# Patient Record
Sex: MALE | Race: WHITE
[De-identification: names, ages, dates, MRNs, and addresses within clinical notes are randomized per-mention and may not be internally consistent; named-entity substitution may affect disease eponyms.]

---

## 2020-02-05 NOTE — CONSULTATION NOTE
Palliative Care Consultation





- Referral


Referring Provider: Dr. Ignacio Garcia


Time of Visit: 1444-9149


Referral setting: Assisted living


Referral Reason: Parkinson's Disease Dementia





- Information Sources


Records reviewed: Previous records reviewed


History/Review of Systems obtained from: Patient, Family (brother/guardian 

Miguel), Caregiver


Exam limitations: Clinical condition (short term and long term memory deficits.)





- History of Present Illness


Brief History of Present Illness: 





This is a 79-year-old man who presents today for initial palliative care 

consultation for Parkinson's disease dementia.  [facility] with a focus on memory care.  His younger brother, Miguel,

who is the patient's legal guardian is able to provide additional background.  

The patient was diagnosed with Parkinson's disease approximately 5 to 6 years 

ago and he presented with a shuffling gait.  His brother reports that the 

patient and his wife lived isolated life spending most of the time at home.  A 

crisis developed when the patient's wife became ill and was hospitalized and 

transitioned to [facility] for rehabilitation and care.Without his wife he was 

unable to manage taking care of himself such as not bathing nor taking his 

medications which ultimately resulted in a hospital admission in 2016.





The patient's brother reports that he has progressively declined and in the last

year or so has been nonambulatory and is wheelchair-bound.  The brother also 

notes worsening cognition more specifically in relation to short-term memory 

deficits.  When the brother visits there are times the patient will recall 

previous lifetime events but during the course of a conversation you will often 

have to repeat yourself enormously as he does not recall what has been said.  

His wife has up to currently passed.  The patient currently resides in home 

place.





The patient himself is wary of introduction of new individuals into his care 

[place].  When this [facility] initially attempted to see the patient on February 3 he 

declined but allowed for a repeat visit with 1 of his caregivers present.  He of

fered no complaints during evaluation.  Caregivers reports that he often times 

will refuse initially medications or care but if he is reapproached he is 

excepting of interventions.





He has a longstanding history of diabetes mellitus.  He is on insulin therapy 

for control.  His last A1c obtained in 2019 was 6.8%.  His blood 

glucose has ranged at 7 AM from 119 2-87, at 11 AM from 210 /3/, at 4 PM 

from 143 2/3/2017, and at 9  2/3/1973.  There have been no episodes of 

hypoglycemia.





Patient has a past medical history that includes Parkinson's disease dementia, 

hypertension, hyperlipidemia, diabetes mellitus type 2, and longstanding skin 

condition that results in him picking his skin that is controlled with topical 

corticosteroid use.





Medical/Surgical History





- Past Medical History


Cardiovascular: reports: Hypertension, High cholesterol, Coronary artery disease


Respiratory: reports: None


Neuro: Dementia, Parkinson's


Neuro: reports: Dementia, Parkinson's


Endocrine/Autoimmune: reports: Type 2 diabetes


GI: reports: None


: reports: None


HEENT: reports: None


Psych: reports: None


Musculoskeletal: reports: None


Derm: reports: Other (Chronic UE pruritus)


MRSA Hx?: No





- Past Surgical History


Cardiovascular: reports: Coronary stent (15-20 years ago to [facility] and [facility])





- Substance History


Use: Uses substance without health or social issues: Tobacco (Former smoker appx

1ppd for 20 years.)





Social History





- Living Situation


Living arrangement: Assisted living


Support System: 





The patient is .  He does not have any children.  His brother, Miguel, 

has assumed guardianship. His brother will make visits to [facility]. 





The patient received a bachelor's of arts in English and advertising.  [place]  He also was active in a band and is 

proficient playing the organ, electric piano, and trombone. He also was an [employer]

 growing up. 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 

some touring.  He was a voracious reader and was always quite content to remain 

at home with his wife reading.  When his brother had to sell off his assets and 

his house after he relocated to [facility], he had a library that was over 2-

3000 books that was donated to the local library.  He no longer reads as he is 

unable to obtain new information while reading.  He continues to enjoy reading 

the newspaper.





Family History





- Family History


Family History: Mother:  (Both  in their 80s), Father: , 

Brother: Alive and Well





Medications/Allergies





- Medications


Home Medications: 


                                Ambulatory Orders











 Medication  Instructions  Recorded  Confirmed


 


Aspirin 81 mg 16


 


Acetaminophen 1,000 mg PO BID 20


 


Amlodipine Besylate 5 mg PO DAILY 20


 


Betamethasone Valerate 60 ml TP BID 20


 


Bisacodyl Supp [Dulcolax Supp] 10 mg [place] DAILY PRN MDD if no BM in 20





 12 hours  


 


Chlorthalidone 25 mg PO DAILY 20


 


Dextrose [Glucose Gel] 1 tube PO Q1HR PRN MDD for 20





 hypoglycemia  


 


Docusate Sodium 250Mg Capsule 250 mg PO DAILY PRN 20





[Colace 250Mg Capsule]   


 


Insulin Aspart [NovoLOG] 30 unit SQ TID MDD before meals 20


 


Insulin Detemir [Levemir Flextouch] 95 unit SQ BID 20


 


LORazepam [Ativan] 1 mg PO Q6H PRN 20


 


Loperamide HCl [Imodium A-D] 2 mg PO DAILY PRN 20


 


Mag Hydrox/Aluminum Hyd/Simeth 30 ml PO Q4H PRN 20





[Mylanta Maximum Strength Liq]   


 


Magnesium Hydroxide [Milk of 30 ml PO DAILY PRN MDD No BM in 3 20





Magnesia] days  


 


Menthol/Zinc Oxide [Calmoseptine 1 applic TP BID PRN 20





Ointment]   


 


Multivitamin [One Daily 1 tab PO DAILY 20





Multivitamin]   


 


Pravastatin Sodium 1 tab PO DAILY PM 20














- Allergies


Allergies/Adverse Reactions: 


                                    Allergies











Allergy/AdvReac Type Severity Reaction Status Date / Time


 


No Known Drug Allergies Allergy   Verified 16 14:14














Review of Systems





- Constitutional


Constitutional: reports: Weight loss (weight presently 184.8lb; weight on 

19 was 188.6lb.).  denies: Fatigue, Fever





- Eyes


Eyes: reports: Corrective lenses (reports to wearing reading glasses).  denies: 

Blurred vision





- Ears, Nose & Throat


Ears, Nose & Throat: denies: Nasal congestion





- Cardiovascular


Cardiovascular: denies: Palpitations, Chest pain, Edema, Lightheadedness





- Respiratory


Respiratory: denies: Cough, Wheezing





- Gastrointestinal


Gastrointestinal: reports: Good appetite.  denies: Abdominal pain, Constipation,

Diarrhea, Vomiting





- Genitourinary


Genitourinary: denies: Dysuria





- Musculoskeletal


Musculoskeletal: reports: Muscle weakness, Assistive devices, Transfer issues.  

denies: Joint pain, Joint swelling





- Integumentary


Integumentary: reports: Pruritis (BUE)





- Neurological


Neurological: reports: General weakness, Memory problems





- Endocrine


Endocrine: reports: Diabetes type 2





- Hematologic/Lymphatic


Hematologic/Lymphatic: reports: Bruising





- Other Findings


Other Findings: 





Patient is a poor historian due to dementia. ROS obtained from brother, Miguel, 

caregivers at facility and patient. 





Physical Exam





- Vital Signs


Temperature: 36.7 C


Pulse Rate: 75


O2 Saturation: 94 (on RA)


Blood Pressure: 104/59 (left wrist cuff)





- Physical Exam


General Appearance: positive: No acute distress, Alert, Other (sitting up in 

bed, suspicous of reason for examination)


Eyes Bilateral: positive: Normal inspection


ENT: positive: No signs of dehydration


Neck: positive: Nml inspection, No JVD, Trachea midline


Cardiovascular: positive: Regular rate & rhythm, No murmur, No gallop.  

negative: Decreased pulse(s)


Respiratory: positive: No respiratory distress, Breath sounds nml


Abdomen: positive: Non-tender, Soft, Nml bowel sounds.  negative: Tenderness, 

Distended


Skin: positive: Bruising, Pruritis (pruritis to BUE with frequent picking to his

skin with various scabs noted as well as resolving ecchymoses.), Other (noted 

scattered scabs to BUE without evidence of infection)


Extremities: positive: No pedal edema.  negative: Joint swelling


Neurologic/Psychiatric: positive: Disoriented to place, Disoriented to time 

(believed that he was 63 years of age and that his wife was still living.), 

Weakness (BLE strength 3/5), Other (able to joke and engage in convesation but 

repetively asked the same question multiple times during the interview.)





Palliative Care





- POLST


Patient has POLST: Yes


POLST Status: DNR, Selective Treatment


Pain: No pain


Nausea: None


Anorexia: None


Feelings of wellbeing/Perceived Quality of Life: Fair


Sleep: Sleeps well


Constipation: No


Performance Status: 





Patient is able to self feed and is 100% reliant on the caregiving staff for 

hygiene care.  He is able to lift up his pelvis while in bed in order for his 

depends to be changed.  He is nonambulatory and is wheelchair-bound.  A sit to 

stand is used with a two-person assist to transfer to his wheelchair.  He is 

able to self feed.





PPS score 50%





- Palliative Care


Discussion: 





The patient has lived a very isolated life according to his brother and 

guardian, Miguel.  Since his diagnosis of Parkinson's disease dementia he has 

had a steady cognitive and functional decline.  He has been nonambulatory for 

approximately 1 year.  He has a waxing and waning mood.  Sometimes he will be in

a good mood joking with the staff and other times he wishes to remain in bed.  

He no longer avidly reads as he is unable to maintain a new story line.  At 

times he will refuse care but will concede when reapproached by the staff.  He 

is very conversant and is able to carry on a conversation and to be quite 

engaged.  He is someone that does not like change according to his brother.  New

things need to be reintroduced slowly to avoid version.  His brother wishes to 

keep the patient content and comfortable as his goals of care.





Results





- Lab Results


Lab results reviewed: Yes


Lab and Imaging Results: 





12/15/2019: HgA1C 6.8%





Impression and Recommendations





- Palliative Care


Impression: 





This is a 79-year-old male who has advancing Parkinson's disease dementia with 

functional and cognitive decline.  He is often resistant to change with waxing 

and waning mood that has been his baseline. He often will be able to be 

redirected by the caregiving staff but at times can decline interventions or 

care. Palliative care will continue to provide support for symptom management 

and anticipatory guidance.


Recommendations/Counseling Done: 





1. Parkinson's disease dementia. Chronic. Progressive. Fall precautions. Will 

decline care at times but when reapproached will be accepting. He does not care 

for change. Noted weight loss. Obtain weekly weights x 4 weeks and notify if 

greater than 3lb weight loss.





2. Pruritis. Unclear underlying issue with UE pruritus with long standing daily 

use of topical corticosteroids with continued perseverating behavior of itching 

and picking at his skin. Unable to obtain records from prior PCP Dr. Trujillo as 

these records are locked in storage. Discussed with brother/guardian, Miguel, 

regarding long term risk of topical corticosteroids such as atrophy and 

increased bruising. Brother/guardian is in agreement to discontinue 

betamethasone lotion application and will trial emollient with aquaphor to be 

applied to BUE BID. If symtpoms continue to persist then will evaluate use of 

nightly gabapentin dose for management. He would not be open to application of 

cotton gloves to reduce scratching effect on his skin. Will continue to monitor 

response. 





3. HTN. No cardiac awareness.  Continue amlodipine and chlorthalidone as or

dered.  Continue ASA 81mg daily for cardiac prophylaxis. Continue to monitor BP 

trends and adjust antihypertensive medications as needed. If blood pressure 

remains well controlled consider dose reduction of chlorthalidone. 





4. Type II Diabetes. No hypoglycemic awareness. Blood glucose range reviewed in 

HPI with noted several blood glucoses documented over 300, but otherwise under 

300.  Last HgA1C 6.8% on 2019.  Goal HgA1C 7-8% given advanced age. Continue 

levemir 95 units BID and novolog 30 units TID AC. Continue to monitor and adjust

insulin dose as needed. 





5. HLD with history of cardiac stent. Continue pravastatin 80mg qhs and ASA 81mg

daily. 





6. Advanced care planning. POLST completed with DNAR and limited interventions 

with a focus on comfort. Offered  services through palliative care but 

brother/guardian declined. 


Time Spent: 





Total time spent 25 minutes with greater than 50% of this spent in counseling 

and coordination of care with brother/guardian as well as facility staff, review

of parkinson's disease dementia progression, POC, examination of patient and rev

iew of symptom management and anticipatory guidance.





Disclaimer: The chart note was formulated using voice recognition technology and

unfortunately sound alike errors may occur.

## 2020-03-05 NOTE — CONSULTATION NOTE
Palliative Care Follow Up





- Referral


Referring Provider: Dr. Ignacio Garcia


Time of Visit: 7743-3813


Referral setting: Assisted living


Referral Reason: f/u Pruritus/Parkinson's Dementia





- Information Sources


Records reviewed: Previous records reviewed


History/Review of Systems obtained from: Patient, Caregiver (clinical staff at 

facility)


Exam limitations: Clinical condition (poor historian due to dementia)





- History of Present Illness Update


Brief HPI Update: 


This is a 80-year-old man who is seen in follow-up today for generalized 

pruritus and A history of Parkinson's disease dementia.  He presently resides in

[facility] which has a focus on memory care.





The patient has a longstanding history of generalized pruritus.  He was 

previously on topical corticosteroid use that had been a longstanding which was 

discontinued.  He was initiated on aquaphor to be applied to his upper and lower

extremities twice daily.  This is resulted in a reduction of xerosis and his 

scratching behaviors.  Intermittently he still has periods where he will scratch

and has an order for as needed loratadine 10 mg to take once daily.  There has 

been no documented use for the month of March.  He did have the loratadine 

administered at least once in February 2020.





Nursing had concerns regards to a scab to his right great toe at the base of his

nail as he would not allow for any intervention.  The patient himself denies any

discomfort.  When attempting to evaluating his feet today he kept asking this 

ARNP to "hurry it up."





Staff deny any other concerns.  He continues to have a longstanding history of 

diabetes mellitus.  He is on insulin therapy for control with his last 

hemoglobin A1c 6.8% in December 2019.  Upon review of his blood glucose trends 

he has stated below 300.  There have also been no episodes of hypoglycemia.





Past medical history includes Parkinson's disease dementia, hypertension, hype

rlipidemia, CAD with stent, diabetes mellitus type 2, and longstanding pruritus.





Social History





- Living Situation


Living arrangement: Assisted living


Support System: 


Patient is  and does not have any children.  His brother, Miguel has 

assumed guardianship.  His brother Miguel is planning to make a visit later this

spring.  The [facility] presently resides at [facility].





Medications/Allergies





- Medications


Home Medications: 


                                Ambulatory Orders











 Medication  Instructions  Recorded  Confirmed


 


Aspirin 81 mg 01/07/16 01/07/16


 


Acetaminophen 1,000 mg PO BID 02/05/20 02/05/20


 


Amlodipine Besylate 5 mg PO DAILY 02/05/20 02/05/20


 


Bisacodyl Supp [Dulcolax Supp] 10 mg NV DAILY PRN MDD if no BM in 02/05/20 02/05/20





 12 hours  


 


Chlorthalidone 25 mg PO DAILY 02/05/20 02/05/20


 


Dextrose [Glucose Gel] 1 tube PO Q1HR PRN MDD for 02/05/20 02/05/20





 hypoglycemia  


 


Docusate Sodium 250Mg Capsule 250 mg PO DAILY PRN 02/05/20 02/05/20





[Colace 250Mg Capsule]   


 


Insulin Aspart [NovoLOG] 30 unit SQ TID MDD before meals 02/05/20 02/05/20


 


Insulin Detemir [Levemir Flextouch] 95 unit SQ BID 02/05/20 02/05/20


 


LORazepam [Ativan] 1 mg PO Q6H PRN 02/05/20 02/05/20


 


Loperamide HCl [Imodium A-D] 2 mg PO DAILY PRN 02/05/20 02/05/20


 


Mag Hydrox/Aluminum Hyd/Simeth 30 ml PO Q4H PRN 02/05/20 02/05/20





[Mylanta Maximum Strength Liq]   


 


Magnesium Hydroxide [Milk of 30 ml PO DAILY PRN MDD No BM in 3 02/05/20 02/05/20





Magnesia] days  


 


Menthol/Zinc Oxide [Calmoseptine 1 applic TP BID PRN 02/05/20 02/05/20





Ointment]   


 


Multivitamin [One Daily 1 tab PO DAILY 02/05/20 02/05/20





Multivitamin]   


 


Pravastatin Sodium 1 tab PO DAILY PM 02/05/20 02/05/20


 


Aquaphor Ointment 1 applic TP BID 02/06/20 


 


Loratadine [Claritin] 10 mg PO DAILY PRN 03/05/20 03/05/20














- Allergies


Allergies/Adverse Reactions: 


                                    Allergies











Allergy/AdvReac Type Severity Reaction Status Date / Time


 


No Known Drug Allergies Allergy   Verified 01/07/16 14:14














Review of Systems





- Constitutional


Constitutional: reports: Weight stable (wewight 189.4lb).  denies: Fatigue, 

Fever, Poor appetite





- Eyes


Eyes: denies: Blurred vision





- Ears, Nose & Throat


Ears, Nose & Throat: denies: Dry mouth





- Cardiovascular


Cardiovascular: denies: Chest pain, Edema





- Respiratory


Respiratory: denies: Cough





- Gastrointestinal


Gastrointestinal: reports: Good appetite.  denies: Abdominal pain, Abdominal 

distention, Constipation, Diarrhea, Vomiting





- Genitourinary


Genitourinary: denies: Dysuria





- Musculoskeletal


Musculoskeletal: reports: Muscle weakness, Assistive devices, Transfer issues.  

denies: Joint pain, Joint swelling





- Integumentary


Integumentary: reports: Other (scab to right great toe base of the nail).  

denies: Pruritis (improved to BUE and BLE)





- Neurological


Neurological: reports: General weakness, Memory problems





- Endocrine


Endocrine: reports: Diabetes type 2





- All Other Systems


All Other Systems: reports: Other (ROS limited as patient is a poor historian 

due to dementia. Obtained additional ROS from clinical staff at facility.)





Physical Exam





- Vital Signs


Temperature: 36.6 C


Pulse Rate: 79


O2 Saturation: 95 (on RA at rest)


Blood Pressure: 100/60 (right wrist cuff)





- Physical Exam


General Appearance: positive: No acute distress, Alert, Other (sitting up in 

bed)


Eyes Bilateral: positive: Normal inspection


ENT: positive: No signs of dehydration


Neck: positive: Trachea midline


Cardiovascular: positive: Regular rate & rhythm, No murmur


Respiratory: positive: No respiratory distress, Breath sounds nml.  negative: 

Rhonchi


Abdomen: positive: Non-tender, Soft, Nml bowel sounds, Other (Bladder 

nondistended)


Skin: positive: Other (+dry, flaking generalized to face. Decreased excoriation 

marks to extremities 2/2 to scratching and improvement of skin hydration. Noted 

scab to base of right great toe nail laterally without surrounding erythema, 

discharge or tenderness to touch. +Elongated toenails bilaterally)


Extremities: positive: No pedal edema.  negative: Joint swelling


Neurologic/Psychiatric: positive: Disoriented to place, Disoriented to time, 

Weakness





Palliative Care





- POLST


Patient has POLST: Yes


POLST Status: DNR, Selective Treatment


Pain: No pain


Anorexia: None


Sleep: Sleeps well


Constipation: No


Performance Status: 


Patient is able to self feed and is 100% reliant on the caregiving staff for 

hygiene care.  He is able to lift up his pelvis while in bed in order for his 

depends to be change but otherwise is nonambulatory and wheelchair-bound.  A sit

 to stand is used with a two-person assist to transfer to his wheelchair.  He is

 able to self feed.





PPS score 50%





- Palliative Care


Discussion: 


The patient has had a steady cognitive and functional decline.  He has been 

nonambulatory for approximately 1 year.  He has a waxing and waning mood.  He 

will joke around with the staff.  He is also reluctant to introduced new faces 

and caregivers into his [place].  At times he will refuse care but when 

reapproached he will be accepting.  Any new interventions or activities need to 

be introduced slowly to avoid version.





Impression and Recommendations





- Palliative Care


Impression: 


This is a 80-year-old male with advancing Parkinson's disease dementia with 

functional and cognitive decline with a longstanding history of generalized 

pruritus.  Improvement of generalized pruritus with application of emollient 

routinely due to all extremities.  He is often resistant to change.  Palliative 

care to continue provide support for symptom management anticipatory guidance.


Recommendations/Counseling Done: 


1. Generalized pruritus.  Improvement after discontinuation of local 

corticosteroids.  Continue Aquaphor application to bilateral upper extremities 

twice daily.  Recommended application of lotion due to generalized dryness.  May

 continue loratadine 10 mg as needed once daily.





2.  Parkinson's disease dementia.  Chronic.  Progressive.  Fall precautions.  

Will often declined care at times but 1 reapproach will be excepting.  He is 

someone that is reluctant for any type of change.  A gradual decline as 

expected.





3.  Coronary artery disease with a history of stent placement.  Discussed with 

brother/guardianMiguel recommendation of discontinuing pravastatin given the 

patient's advanced age and co-morbidities  given the time to see the benefit of 

statin therapy. After weighing benefits vs burdens, the brother/guardian wishes 

to continue statin therapy at this time and re-address discontinuation in the 

future if noted further decline. 





4. Scab to right great toe below nail. No evidence of infection. Continue to 

monitor site until resolved and notify of changes or s/s of infection. 





5. Elongated toenails. Request nursing staff to trim toenails as patient allows.

 





6. Advance care planning.  POLST in place with DN AR and limited interventions 

with a focus on comfort.Palliative care to continue to build rapport and explore

 goals of care.








Time Spent: 





Total time spent 20 minutes with greater than 50% of this spent in counseling 

and coordination of care with clinical staff at facility; examination of 

patient;  review of pain and symptom management and anticipatory guidance.


Reviewed POC with patient's brother/guardian Miguel via phone ([phone]) 

with questions answered and addressed. 





Disclaimer: The chart note was formulated using voice recognition technology and

 unfortunately sound alike errors may occur.

## 2020-06-09 NOTE — CONSULTATION NOTE
Palliative Care Follow Up





- Referral


Referring Provider: Dr. Ignacio Garcia


Time of Visit: 1728-1394


Referral setting: Assisted living


Referral Reason: Type II DM/Pruritus/Dementia





- Information Sources


Records reviewed: Previous records reviewed


History/Review of Systems obtained from: Patient, Caregiver, Nursing


Exam limitations: Clinical condition (Poor historian due to dementia)





- History of Present Illness Update


Brief HPI Update: 





This is a 80-year-old man who was seen in follow-up today with a history of Par

kinson's disease dementia, type 2 diabetes mellitus, and skin concerns with 

pruritus.  He presently resides in [facility] assisted living with a focus on 

memory care.





He has a longstanding history of generalized pruritus.  He was previously on 

topical corticosteroid use that had been longstanding and was discontinued 

2/5/2020.  He presently receives Aquaphor application to his upper extremities t

wice daily.He is compliant with the staff applying Aquaphor but is often not 

compliant in allowing them to fully rub in the Aquaphor.He denies pruritus 

however he still has episodes of scratching behaviors most notably to his upper 

extremities.Today he is noted to have a papular rash along his right elbow to 

right upper forearm with evidence of excoriation from scratching.  He denied any

itching at the site.





The patient has a history of diabetes mellitus.  He is presently on NovoLog 30 

units 3 times daily before meals as Levemir and 95 units twice daily with orders

to hold if his blood glucose levels are less than 120.Upon review his blood 

glucose log today he had a blood glucose level of 116 and his insulin therapy 

was held per parameters.


Blood glucose levels are as follows:


0700: 249, 285, 157, 143, 172, 135, 172, 169, 116


1100: 344, 292, 156, 267, 220, 329, 233, 259


1600: 233, 251, 179, 192, 206, 230, 108, 220


2100: 148, 213, 233, 233, 178, 178, 223, 172





The patient was having difficulties with constipation and was initiated on 

routine MiraLAX on 5/22.  Staff report that his bowel regimen has improved.  The

patient himself denies any abdominal pain or discomfort.  He continues to 

consume majority of his meals.  His weight remains stable at 192.6lb. 





The patient has a parse medical history of Parkinson's disease dementia, 

hypertension, hyperlipidemia, CAD with stent, diabetes mellitus type 2, and 

longstanding pruritus.





Social History





- Living Situation


Living arrangement: Assisted living


Support System: 





The patient is  and does not have any children.  His brother, Miguel Castañeda has assumed guardianship.  Miguel's contact number is [phone].  The 

patient resides at home place [facility].  Due to the coronavirus pandemic and 

the facility being on lockdown the patient's brother, Miguel has not seen the 

patient in several months.  Miguel plans on setting up with the facility 

 a time to do a distant visit were all parties are 

appropriately distanced and masked so that way he may be able to interact and 

see his brother.





Medications/Allergies





- Medications


Home Medications: 


                                Ambulatory Orders











 Medication  Instructions  Recorded  Confirmed


 


Aspirin 81 mg 01/07/16 01/07/16


 


Acetaminophen 1,000 mg PO BID 02/05/20 06/09/20


 


Amlodipine Besylate 5 mg PO DAILY 02/05/20 06/09/20


 


Bisacodyl Supp [Dulcolax Supp] 10 mg [place] DAILY PRN MDD if no BM in 02/05/20 02/05/20





 12 hours  


 


Chlorthalidone 25 mg PO DAILY 02/05/20 06/09/20


 


Dextrose [Glucose Gel] 1 tube PO Q1HR PRN MDD for 02/05/20 02/05/20





 hypoglycemia  


 


Docusate Sodium 250Mg Capsule 250 mg PO DAILY PRN 02/05/20 02/05/20





[Colace 250Mg Capsule]   


 


Insulin Aspart [NovoLOG] 30 unit SQ TID MDD before meals 02/05/20 06/09/20


 


Insulin Detemir [Levemir Flextouch] 95 unit SQ BID 02/05/20 06/09/20


 


LORazepam [Ativan] 1 mg PO Q6H PRN 02/05/20 02/05/20


 


Loperamide HCl [Imodium A-D] 2 mg PO DAILY PRN 02/05/20 02/05/20


 


Mag Hydrox/Aluminum Hyd/Simeth 30 ml PO Q4H PRN 02/05/20 02/05/20





[Mylanta Maximum Strength Liq]   


 


Magnesium Hydroxide [Milk of 30 ml PO DAILY PRN MDD No BM in 3 02/05/20 02/05/20





Magnesia] days  


 


Menthol/Zinc Oxide [Calmoseptine 1 applic TP BID PRN 02/05/20 06/09/20





Ointment]   


 


Multivitamin [One Daily 1 tab PO DAILY 02/05/20 06/09/20





Multivitamin]   


 


Pravastatin Sodium 1 tab PO DAILY PM 02/05/20 06/09/20


 


Aquaphor Ointment 1 applic TP BID 02/06/20 06/09/20


 


Loratadine [Claritin] 10 mg PO DAILY PRN 03/05/20 03/05/20


 


polyethylene glycoL 3350 [Miralax] 17 g PO DAILY 06/09/20 06/09/20














- Allergies


Allergies/Adverse Reactions: 


                                    Allergies











Allergy/AdvReac Type Severity Reaction Status Date / Time


 


No Known Drug Allergies Allergy   Verified 06/09/20 16:12














Review of Systems





- Constitutional


Constitutional: reports: Weight stable (weight 192.6lb 6/2/2020; 4/21/2020 

189lb).  denies: Fever





- Eyes


Eyes: denies: Blurred vision





- Ears, Nose & Throat


Ears, Nose & Throat: denies: Hearing loss, Dry mouth





- Cardiovascular


Cardiovascular: denies: Chest pain, Edema





- Respiratory


Respiratory: denies: Cough, Wheezing





- Gastrointestinal


Gastrointestinal: reports: Good appetite.  denies: Abdominal pain, Abdominal 

distention, Constipation, Diarrhea, Vomiting





- Genitourinary


Genitourinary: reports: Incontinence.  denies: Dysuria





- Musculoskeletal


Musculoskeletal: reports: Assistive devices, Transfer issues.  denies: Joint 

pain





- Integumentary


Integumentary: reports: Rash (see HPI for full details), Pruritis





- Neurological


Neurological: reports: General weakness, Memory problems





- Endocrine


Endocrine: reports: Diabetes type 2





- Hematologic/Lymphatic


Hematologic/Lymphatic: reports: Bruising





- All Other Systems


All Other Systems: reports: Reviewed and negative (ROS limited as patient is a 

poor historian due to dementia. ROS obtained from nursing facility staff.)





Physical Exam





- Vital Signs


Temperature: 36.5 C


Pulse Rate: 71


O2 Saturation: 98 (on RA at rest)


Blood Pressure: 136/81





- Physical Exam


General Appearance: positive: No acute distress, Alert, Other (sitting up in 

bed, slightly dishelved)


Eyes Bilateral: positive: Normal inspection


ENT: positive: No signs of dehydration


Neck: positive: No JVD, Trachea midline


Cardiovascular: positive: Regular rate & rhythm, No murmur


Respiratory: positive: No respiratory distress, Breath sounds nml.  negative: 

Rales


Abdomen: positive: Non-tender, Soft, Nml bowel sounds, Other (bladder 

nondistended to palpation)


Skin: positive: Rash (papular rash noted to RUE along right elbow and forearm 

with areas of excoriation indicivate of scratching that is nontender to 

palpation and without warmth to palpation. No discharge to site.)


Extremities: positive: No pedal edema


Neurologic/Psychiatric: positive: Disoriented to place, Disoriented to time, 

Weakness





Palliative Care





- POLST


Patient has POLST: Yes


POLST Status: DNR, Selective Treatment


Anorexia: None


Constipation: Yes, Managed (see HPI for additional details)


Performance Status: 





PPS score 50%. No recent falls.  Able to self feed.





- Palliative Care


Discussion: 





The patient continues to have a slow cognitive and functional decline.  He has 

been nonambulatory for over 1 year.  His temperament and his interactions will 

wax and wane.  Today, he is pleasant and jovial.  He has a longstanding history 

of pruritus with topical corticosteroid application.  Noted to his right upper 

extremity today is a localized papular, rash that would benefit from a short 

course of topical corticosteroid application.





Discussed with the patient's brother/guardian that he would benefit from seeing 

him during a socially distant visit.  The patient's brother, Miguel is to reach 

out to the  to facilitate a time that he may visit.





Results





- Lab Results


Lab results reviewed: Yes


Lab and Imaging Results: 





3/11/2020





Hemoglobin A1c 7.3%





Impression and Recommendations





- Palliative Care


Impression: 





This is a 80-year-old male with advanced Parkinson's disease dementia with 

functional and cognitive decline with a longstanding history of generalized 

pruritus.  He has a localized papular rash to his right upper extremity.  He 

continues to be maintained on insulin therapy for diabetes mellitus type 2 

without evidence of hypoglycemia.  Palliative care to continue provide support 

for symptom management and anticipatory guidance.


Recommendations/Counseling Done: 





1. Atopic dermatitis. Localized to right upper extremity near the elbow.  No 

signs or symptoms of infection.  Recommend station to initiate triamcinolone 

0.1% cream to be applied twice daily x7 days before application of Aquaphor.  

Staff to monitor site for 7 days and notify MD/ARNP if signs or symptoms of i

nfection.





2. Diabetes mellitus type 2.  No hypoglycemic awareness. Blood glucose log 

reviewed with only 2 episodes of levels above 300. Last HgA1C 7.1% on 3/11/2020.

 Goal HgA1C 7-8% given advanced age. Continue novolog 30 units TID with meals 

and hold if blood glucose level is less than 120. Continue lantus 95 units BID 

and hold if blood glucose levels less than 120. Continue to monitor blood glu

cose trend and adjust insulin therapy regimen as needed.





3. Constipation.  Improved and controlled.  Continue MiraLAX 17 g daily and hold

 for loose stools.  Continue to monitor.





4.  Parkinson's disease dementia.  Chronic.  Progressive.  Fall precautions.  He

 is reluctant to any type of change.  A gradual slow and progressive decline is 

expected.


Time Spent: 





CPT 97833





POC reviewed with nursing staff. Discussed POC with patient's brother/guardian, 

Miguel Mckeon and in agreement with understanding verbalized with questions 

answered and addressed. Reviewed with brother regarding Palliative Care's Role. 





Disclaimer: The chart note was formulated using voice recognition technology and

 unfortunately sound alike errors may occur.

## 2020-12-01 ENCOUNTER — HOSPITAL ENCOUNTER (OUTPATIENT)
Dept: HOSPITAL 76 - PC | Age: 80
Discharge: HOME | End: 2020-12-01
Attending: NURSE PRACTITIONER
Payer: MEDICARE

## 2020-12-01 DIAGNOSIS — I25.10: ICD-10-CM

## 2020-12-01 DIAGNOSIS — G20: ICD-10-CM

## 2020-12-01 DIAGNOSIS — I10: ICD-10-CM

## 2020-12-01 DIAGNOSIS — Z79.4: ICD-10-CM

## 2020-12-01 DIAGNOSIS — Z66: ICD-10-CM

## 2020-12-01 DIAGNOSIS — F02.80: ICD-10-CM

## 2020-12-01 DIAGNOSIS — Z51.5: Primary | ICD-10-CM

## 2020-12-01 DIAGNOSIS — E11.9: ICD-10-CM

## 2020-12-01 DIAGNOSIS — L85.3: ICD-10-CM

## 2020-12-01 DIAGNOSIS — E78.5: ICD-10-CM

## 2020-12-01 NOTE — CONSULTATION NOTE
Palliative Care Follow Up





- Referral


Referring Provider: Dr. Ignacio Garcia


Time of Visit: Initated 1005


Referral setting: Assisted living


Referral Reason: Dementia/DM Type II





- Information Sources


Records reviewed: Previous records reviewed


History/Review of Systems obtained from: Patient, Nursing


Exam limitations: Clinical condition (Poor historian due to dementia.)





- History of Present Illness Update


Brief HPI Update: 





This is a 80-year-old man who was seen in follow-up today with history of 

Parkinson's disease, dementia, type 2 diabetes mellitus and stable weight.  He 

resides in home place [facility].





Patient has a history of diabetes mellitus type 2.  He is presently on Levemir 

95 units in the morning and Levemir 90 units in the evening to hold if his blood

glucose level is less than 120.  He is also on a sliding scale insulin coverage 

with meals. His last hemoglobin A1c was 7.2% on 9/29/2020.  He periodically has 

his Levemir held due to his blood glucose level being less than 120.  No reports

of hyper or hypoglycemia events.  The patient continues to have a good appetite 

and has no complaints regarding the food at the facility.  He is weight is 

remaining stable at approximately 102 pounds.  His most present weight is 1 or 

2.6 pounds.  He denies any lower extremity edema.





The patient has not had any recent falls.  No behavioral concerns reported by 

the facility staff.





The patient is seen out of bed in his wheelchair in his room watching TV with 

his roommate.  No evidence of acute distress.  Dry flaky skin noted to his 

sweatshirt.





Medications reviewed and there have been no changes to the patient's medications

since last evaluation.


Past Medical History: 





Patient has a past medical history of Parkinson's disease, dementia, 

hypertension, hyperlipidemia, CAD with stents, diabetes mellitus type 2, 

longstanding pruritus.





Social History





- Living Situation


Living arrangement: Assisted living


Support System: 





Patient is  and does not have any children.  His brother, Miguel Castañeda 

has guardianship and his contact number is [phone].  The patient has 

resided at home place [facility] since January 2016.





Medications/Allergies





- Medications


Home Medications: 


                                Ambulatory Orders











 Medication  Instructions  Recorded  Confirmed


 


Aspirin 81 mg 01/07/16 01/07/16


 


Acetaminophen 1,000 mg PO BID 02/05/20 06/09/20


 


Amlodipine Besylate 5 mg PO DAILY 02/05/20 06/09/20


 


Bisacodyl Supp [Dulcolax Supp] 10 mg [place] DAILY PRN MDD if no BM in 02/05/20 02/05/20





 12 hours  


 


Chlorthalidone 25 mg PO DAILY 02/05/20 06/09/20


 


Dextrose [Glucose Gel] 1 tube PO Q1HR PRN MDD for 02/05/20 02/05/20





 hypoglycemia  


 


Docusate Sodium 250Mg Capsule 250 mg PO DAILY PRN 02/05/20 02/05/20





[Colace 250Mg Capsule]   


 


Insulin Aspart [NovoLOG] 30 unit SQ TID MDD before meals 02/05/20 06/09/20


 


Insulin Detemir [Levemir Flextouch] 95 unit SQ DAILY 02/05/20 06/09/20


 


LORazepam [Ativan] 1 mg PO Q6H PRN 02/05/20 02/05/20


 


Loperamide HCl [Imodium A-D] 2 mg PO DAILY PRN 02/05/20 02/05/20


 


Mag Hydrox/Aluminum Hyd/Simeth 30 ml PO Q4H PRN 02/05/20 02/05/20





[Mylanta Maximum Strength Liq]   


 


Magnesium Hydroxide [Milk of 30 ml PO DAILY PRN MDD No BM in 3 02/05/20 02/05/20





Magnesia] days  


 


Menthol/Zinc Oxide [Calmoseptine 1 applic TP BID PRN 02/05/20 06/09/20





Ointment]   


 


Multivitamin [One Daily 1 tab PO DAILY 02/05/20 06/09/20





Multivitamin]   


 


Pravastatin Sodium 1 tab PO DAILY PM 02/05/20 06/09/20


 


Aquaphor Ointment 1 applic TP BID 02/06/20 06/09/20


 


Loratadine [Claritin] 10 mg PO DAILY PRN 03/05/20 03/05/20


 


polyethylene glycoL 3350 [Miralax] 17 g PO DAILY 06/09/20 06/09/20


 


Insulin Detemir [Levemir Flextouch] 90 units SQ QPM MDD Hold BG level 08/04/20 08/04/20





 less than 120  














- Allergies


Allergies/Adverse Reactions: 


                                    Allergies











Allergy/AdvReac Type Severity Reaction Status Date / Time


 


cheese Allergy  Unknown Verified 08/04/20 12:54


 


tobramycin Allergy  Unknown Verified 08/04/20 12:54














Review of Systems





- Constitutional


Constitutional: reports: Weight stable (202lb).  denies: Fever, Poor appetite





- Eyes


Eyes: denies: Irritation





- Ears, Nose & Throat


Ears, Nose & Throat: denies: Hearing aids





- Cardiovascular


Cardiovascular: denies: Palpitations, Chest pain, Edema





- Respiratory


Respiratory: denies: Cough





- Gastrointestinal


Gastrointestinal: reports: Good appetite.  denies: Constipation, Vomiting





- Genitourinary


Genitourinary: reports: Incontinence.  denies: Dysuria





- Musculoskeletal


Musculoskeletal: reports: Assistive devices.  denies: Muscle pain





- Integumentary


Integumentary: reports: Pruritis (chronic, presently controlled with aquaphor 

application), Dryness.  denies: Rash





- Neurological


Neurological: reports: General weakness, Memory problems





- Psychiatric


Psychiatric: denies: Depression





- Endocrine


Endocrine: reports: Diabetes type 2





- Hematologic/Lymphatic


Hematologic/Lymph: Other (No history of recurrent infections)





- All Other Systems


All Other Systems: reports: Reviewed and negative (Review of systems is limited 

as patient is a poor historian due to dementia.  Review of systems supplemented 

from facility RN and caregivers.)





Physical Exam





- Vital Signs


Temperature: 36.1 C


Pulse Rate: 75


O2 Saturation: 99 (on RA at rest)


Blood Pressure: 130/72 (left wrist cuff sitting)





- Physical Exam


General Appearance: positive: No acute distress, Alert, Other (OOB in 

wheelchair, slightly dishelved)


Eyes Bilateral: positive: Normal inspection


ENT: positive: No signs of dehydration


Neck: positive: Trachea midline


Cardiovascular: positive: Regular rate & rhythm, No murmur


Respiratory: positive: No respiratory distress, Breath sounds nml


Abdomen: positive: Non-tender, Soft, Nml bowel sounds, Other (+round)


Skin: positive: Dryness (diffuse with visible flaking on sweat shirt and to 

face; no dryness noted to BUE or BLE due to aquaphor application), Other (trace 

erythema noted to flexors of both UE)


Extremities: positive: No pedal edema


Neurologic/Psychiatric: positive: Mood/affect nml, Disoriented to place, 

Disoriented to time, Weakness, Other (Repeats his questions but no behavioral 

disturbances notes)





Palliative Care





- POLST


Patient has POLST: Yes


POLST Status: DNR, Selective Treatment


Performance Status: 





PPS50%





- Palliative Care


Discussion: 





The patient continues to have a slow cognitive and functional decline.  He has 

been nonambulatory for 1.5 years.  He presently denies any acute concerns and 

the staff report that he is presently stable and at baseline.





Results





- Lab Results


Lab results reviewed: Yes


Lab and Imaging Results: 





9/29/2020


Sodium 140, potassium 3.9, BUN 30, creatinine 0.7, Glucose 133, albumin 3.7, alk

phos 86, AST 13, ALT 22, hemoglobin A1c 7.2%





Impression and Recommendations





- Palliative Care


Impression: 





This is a 80-year-old man with Parkinson's disease dementia with functional and 

cognitive decline, type 2 diabetes mellitus presently controlled with stable 

weight.  He continues to have a slow functional decline.  No acute concerns 

reported by the patient or facility staff.  Palliative care to continue provide 

support for symptom management and anticipatory guidance.


Recommendations/Counseling Done: 





1.  Diabetes mellitus type 2.  No hypoglycemic awareness.  Blood glucose log 

reviewed.  Last hemoglobin A1c 7.2% on 9/29/2020.  Continue NovoLog 30 units 3 

times daily with meals.  Continue Levemir 95 units in the morning and Levemir 90

units in the evenings and hold if blood glucose levels less than 120.  Goal 

hemoglobin A1c is 7 to 8% given the patient's advanced age.  Continue to monitor

blood glucose trends and adjust insulin therapy as needed.





2.  Pruritus and generalized dryness.  Continue Aquaphor application to 

extremities twice daily.  In the past patient has had evidence of excoriation 

marks from scratching none visible today indicative of underlying control.  He 

does have a longstanding history of topical corticosteroid application in the 

past that was discontinued in February 2020.





3.  Hyperlipidemia with history of cardiac stent.  Presently on pravastatin 80 

mg nightly and aspirin 81 mg daily.  Previously introduced the idea of 

discontinuation of pravastatin therapy to the patient's brother/DPOABut this was

declined.  Continue present and ordered medication therapy and if in the future 

note decline with the patient then reassess for discontinuation.  Scheduled for 

yearly lipid panel followed by PCP.





4.  Parkinson's disease dementia.  Chronic.  Progressive.  Fall precautions.  

Patient is not on any disease modifying agents.  No behavioral concerns reported

by staff.  In the past he has been reluctant to any type of change.  A gradual 

slow and progressive decline is expected.





5.  Advanced care planning.  POLST in place as DN AR with limited interventions 

with a focus on comfort. Palliative care to continue to provide support as it is

not feasible for the patient to leave her present setting. 


Time Spent: 





CPT 36961





Plan of care reviewed with nursing staff at facility with questions answered and

addressed.  Message left for patient's brother/DPOA, Miguel Castañeda at 

[phone], awaiting return call.





Disclaimer: The chart note was formulated using voice recognition technology and

unfortunately sound alike errors may occur.

## 2021-03-02 ENCOUNTER — HOSPITAL ENCOUNTER (OUTPATIENT)
Dept: HOSPITAL 76 - PC | Age: 81
Discharge: HOME | End: 2021-03-02
Attending: NURSE PRACTITIONER
Payer: MEDICARE

## 2021-03-02 DIAGNOSIS — Z79.4: ICD-10-CM

## 2021-03-02 DIAGNOSIS — G20: ICD-10-CM

## 2021-03-02 DIAGNOSIS — L29.9: ICD-10-CM

## 2021-03-02 DIAGNOSIS — E78.5: ICD-10-CM

## 2021-03-02 DIAGNOSIS — L85.3: ICD-10-CM

## 2021-03-02 DIAGNOSIS — Z95.818: ICD-10-CM

## 2021-03-02 DIAGNOSIS — E11.9: ICD-10-CM

## 2021-03-02 DIAGNOSIS — Z66: ICD-10-CM

## 2021-03-02 DIAGNOSIS — Z51.5: Primary | ICD-10-CM

## 2021-03-02 NOTE — CONSULTATION NOTE
Palliative Care Follow Up





- Referral


Referring Provider: Dr. Ignacio Garcia


Time of Visit: Initated 1540


Referral setting: Assisted living


Referral Reason: Dementia/DM type II





- Information Sources


Records reviewed: Previous records reviewed


History/Review of Systems obtained from: Patient, Caregiver, Nursing


Exam limitations: Clinical condition (Memory impairment due to dementia)





- History of Present Illness Update


Brief HPI Update: 





This is a 81-year-old male who was seen in follow-up today with a history of 

Parkinson's disease, dementia, type 2 diabetes mellitus who resides at home 

place memory care seen in routine follow-up today.





Patient has a history of diabetes mellitus type 2.  His blood glucose levels 

have been relatively stable with most recent at 7 AM Less than 150, at noon 

blood glucose levels less than 218, at 4 PM blood glucose levels less than 219, 

and at bedtime blood glucose levels less than 170.He presently receives Levemir 

75 units in the morning and 90 units in the evening with 30 units of NovoLog 3 

times daily before meals.  His last hemoglobin A1c was 7.2% on 9/29/2020.  No 

reports of hyper or hypoglycemic events.  The patient continues to have a hearty

appetite..  Today, he is requesting for additional snacks during this [facility]'s 

visit as he is always hungry.  His weight remains stable presently at 203.5 

pounds.





The patient recently developed bruising to his right hand along his fifth and 

fourth fingers.  The area is resolving.  Nontender.  Possibly occurred due to 

the use of a Fernando lift per facility staff report.





He has not had any recent falls.  No behavioral concerns reported by staff.





The patient himself reports that he is sleeping well.  Denies complaints of pain

presently.





Patient is seen resting in bed, alert with some dry flaky skin noted on his 

shirt.  No evidence of acute distress.


Past Medical History: 





Patient has a past medical history of Parkinson's disease, dementia, 

hypertension, hyperlipidemia, CAD with stents, diabetes mellitus type 2, 

longstanding pruritus.





Social History





- Living Situation


Living arrangement: Assisted living


Support System: 





Patient is  and does not have any children.  His brother, Miguel Castañeda 

has guardianship and his contact number is [phone].  The patient has 

resided at home place [facility] since January 2016.





The patient's brother, Miguel has had periodic door visits since the start of 

the coronavirus pandemic.  Miguel last saw the patient approximately 2 weeks ago

and he appeared clean shaven and Miguel reports "the best he is looked in some 

time."











Medications/Allergies





- Medications


Home Medications: 


                                Ambulatory Orders











 Medication  Instructions  Recorded  Confirmed


 


Aspirin 81 mg 01/07/16 01/07/16


 


Acetaminophen 1,000 mg PO BID 02/05/20 06/09/20


 


Amlodipine Besylate 5 mg PO DAILY 02/05/20 06/09/20


 


Bisacodyl Supp [Dulcolax Supp] 10 mg [place] DAILY PRN MDD if no BM in 02/05/20 02/05/20





 12 hours  


 


Chlorthalidone 25 mg PO DAILY 02/05/20 06/09/20


 


Dextrose [Glucose Gel] 1 tube PO Q1HR PRN MDD for 02/05/20 02/05/20





 hypoglycemia  


 


Docusate Sodium 250Mg Capsule 250 mg PO DAILY PRN 02/05/20 02/05/20





[Colace 250Mg Capsule]   


 


Insulin Aspart [NovoLOG] 30 unit SQ TID MDD before meals 02/05/20 06/09/20


 


Insulin Detemir [Levemir Flextouch] 95 unit SQ DAILY 02/05/20 06/09/20


 


LORazepam [Ativan] 1 mg PO Q6H PRN 02/05/20 02/05/20


 


Loperamide HCl [Imodium A-D] 2 mg PO DAILY PRN 02/05/20 02/05/20


 


Mag Hydrox/Aluminum Hyd/Simeth 30 ml PO Q4H PRN 02/05/20 02/05/20





[Mylanta Maximum Strength Liq]   


 


Magnesium Hydroxide [Milk of 30 ml PO DAILY PRN MDD No BM in 3 02/05/20 02/05/20





Magnesia] days  


 


Menthol/Zinc Oxide [Calmoseptine 1 applic TP BID PRN 02/05/20 06/09/20





Ointment]   


 


Multivitamin [One Daily 1 tab PO DAILY 02/05/20 06/09/20





Multivitamin]   


 


Pravastatin Sodium 1 tab PO DAILY PM 02/05/20 06/09/20


 


Aquaphor Ointment 1 applic TP BID 02/06/20 06/09/20


 


Loratadine [Claritin] 10 mg PO DAILY PRN 03/05/20 03/05/20


 


polyethylene glycoL 3350 [Miralax] 17 g PO DAILY 06/09/20 06/09/20


 


Insulin Detemir [Levemir Flextouch] 90 units SQ QPM MDD Hold BG level 08/04/20 08/04/20





 less than 120  














- Allergies


Allergies/Adverse Reactions: 


                                    Allergies











Allergy/AdvReac Type Severity Reaction Status Date / Time


 


cheese Allergy  Unknown Verified 08/04/20 12:54


 


tobramycin Allergy  Unknown Verified 08/04/20 12:54














Review of Systems





- Constitutional


Constitutional: reports: Weight stable (203.5lb).  denies: Fatigue, Fever, Poor 

appetite





- Eyes


Eyes: denies: Irritation





- Ears, Nose & Throat


Ears, Nose & Throat: denies: Dry mouth





- Cardiovascular


Cardiovascular: denies: Chest pain, Edema





- Respiratory


Respiratory: denies: Cough, SOB at rest





- Gastrointestinal


Gastrointestinal: reports: Good appetite.  denies: Abdominal pain, Constipation,

Nausea





- Genitourinary


Genitourinary: reports: Incontinence.  denies: Dysuria





- Musculoskeletal


Musculoskeletal: reports: Assistive devices.  denies: Muscle pain, Joint pain





- Integumentary


Integumentary: reports: Pruritis (chronic, presently controlled with aquaphor 

application), Dryness





- Neurological


Neurological: reports: General weakness, Memory problems





- Psychiatric


Psychiatric: denies: Behavior disturbances





- Endocrine


Endocrine: reports: Diabetes type 2





- Hematologic/Lymphatic


Hematologic/Lymph: reports: Bruising (right 4th and 5th fingers), Other (No 

history of recurrent infections)





- All Other Systems


All Other Systems: reports: Reviewed and negative (Review of systems is limited 

as patient is a poor historian due to dementia.  Review of systems supplemented 

from facility [facility] and caregivers.)





Physical Exam





- Vital Signs


Temperature: 36.8 C


Pulse Rate: 74


O2 Saturation: 98 (on RA at rest)


Blood Pressure: 118/65 (left wrist cuff)





- Physical Exam


General Appearance: positive: No acute distress, Alert, Other (slightly 

dishelved, resting in bed)


Eyes Bilateral: positive: Normal inspection


ENT: positive: No signs of dehydration


Neck: positive: Trachea midline


Cardiovascular: positive: Regular rate & rhythm.  negative: Decreased pulse(s)


Respiratory: positive: No respiratory distress, Breath sounds nml


Abdomen: positive: Non-tender, Soft, Nml bowel sounds, Other (+round)


Skin: positive: Dryness (diffuse with visible flaking on shirt; no dryness noted

to BUE or BLE due to aquaphor application), Other (trace erythema noted to 

flexors of both UE; resolving bruising localized to right hand on 4th and 5th 

fingers between MCP and PIP on both fingers that is nontender to touch)


Extremities: positive: No pedal edema


Neurologic/Psychiatric: positive: Mood/affect nml, Disoriented to place, 

Disoriented to time, Weakness, Other (Repeats his questions and is suspicious re

garding why this ARNP is present, but is calm and redirectable.)





Palliative Care





- POLST


Patient has POLST: Yes


POLST Status: DNR, Selective Treatment


Pain: No pain


Anorexia: None


Depression: None


Anxiety: None


Sleep: Sleeps well


Constipation: No, Managed


Performance Status: 





PPS 50%





- Palliative Care


Discussion: 





The patient appears to have plateaued at the present time with his functional 

and cognitive decline.  He has been nonambulatory for almost 2 years.





The patient's brother, Miguel perceives that That the patient's awareness of his

Environment remains intact but his short-term memory remains poor.  His brother 

recognizes that dementia is a slow, progressive course and does except that the 

patient has presently plateaued recognizing that there may be a point in time 

where he may have continued progression of his disease state.  The patient's 

brother is relieved that the patient is appearing to be content in his present 

circumstances and for the care that has been provided to him and his present 

living environment.





Impression and Recommendations





- Palliative Care


Impression: 





This is a 80-year-old male with Parkinson's disease dementia with a plateaued 

functional and cognitive decline, diabetes mellitus type 2 presently controlled 

with stable weight and control pruritus and generalized dryness. No acute 

concerns reported by the patient or facility staff. Palliative care to continue 

to provide support for symptom management, care coordination and anticipatory 

guidance.


Recommendations/Counseling Done: 





1. Diabetes mellitus type 2. No hypoglycemic awareness. Blood glucose log 

reviewed. Last hemoglobin A1c 7.2% on 9/29/2020. Continue Levemir 95 units in 

the morning and Levemir 90 units in the evening and hold if blood glucose levels

are less than 120. Continue NovoLog 30 units three times daily with meals. 

Patient continues to have a hearty appetite. Obtain hemoglobin A1c and CMP and 

follow with results. Upon results of lab work moving forward would request darshan verduzco CMP and hemoglobin A1c as previously requested by PCP. Goal hemoglobin A1c 

is 7 to 8% given the patient's advanced age. Continue to monitor blood glucose 

trends and adjust insulin therapy as needed.





2. Hyperlipidemia with history of cardiac stent. Presently on pravastatin 80 mg 

nightly and aspirin 81 mg daily. Continue to to readdress in the 

future.discontinuation regarding pravastatin therapy if burdens outweigh the 

benefits for the patient in the future. Continue yearly lipid panel followed by 

PCP.





3. Localized bruising to right hand. This appears to be due to equipment. No 

evidence of tenderness upon evaluation. Monitor bruising until resolution.





4. Pruritus and generalized dryness. Continue Aquaphor application to 

extremities twice daily. No evidence of excoriation marks from scratching which 

was seen on previous evaluation sometime ago. He does have a longstanding 

history of topical corticosteroid application in the past that was discontinued 

and February 2020. Possible that the patient may have some underlying atopic 

dermatitis.





5.Parkinson's disease dementia. Chronic. Progressive. Fall precautions. Patient 

is not on any disease modifying agents. No behavioral concerns reported by 

staff. The patient tends to be reluctant to any type of change. A gradual, slow 

and progressive decline is expected.





CPT 53241





Plan of care reviewed with facility [facility] regarding plan of care with 

questions answered and addressed. Contacted patient's brother/DPOA, Miguel Castañeda

at [phone] and reviewed plan of care with questions answered and addressed 

and in agreement for obtainment of lab work.

## 2021-05-24 ENCOUNTER — HOSPITAL ENCOUNTER (OUTPATIENT)
Dept: HOSPITAL 76 - PC | Age: 81
Discharge: HOME | End: 2021-05-24
Attending: NURSE PRACTITIONER
Payer: MEDICARE

## 2021-05-24 DIAGNOSIS — Z51.5: Primary | ICD-10-CM

## 2021-05-24 DIAGNOSIS — Z66: ICD-10-CM

## 2021-05-24 DIAGNOSIS — F02.80: ICD-10-CM

## 2021-05-24 DIAGNOSIS — E11.9: ICD-10-CM

## 2021-05-24 DIAGNOSIS — G20: ICD-10-CM

## 2021-05-24 DIAGNOSIS — L29.9: ICD-10-CM

## 2021-05-24 DIAGNOSIS — L85.3: ICD-10-CM

## 2021-05-24 DIAGNOSIS — Z79.4: ICD-10-CM

## 2021-05-24 DIAGNOSIS — I10: ICD-10-CM

## 2021-05-24 NOTE — CONSULTATION NOTE
Palliative Care Follow Up





- Referral


Referring Provider: Dr. Ignacio Garcia


Time of Visit: Initiated 1430


Referral setting: Assisted living


Referral Reason: Parkinson's Disease/Dementia/DM type II





- Information Sources


Records reviewed: Previous records reviewed


History/Review of Systems obtained from: Patient, Nursing ([facility])


Exam limitations: Clinical condition





- History of Present Illness Update


Brief HPI Update: 





This is a 81-year-old gentleman who was seen in follow-up today with a history 

of Parkinson's disease, dementia, and type 2 diabetes mellitus who resides at 

home place [facility] care seen in routine follow-up today.





The patient has a history of diabetes mellitus type 2.  His last hemoglobin A1c 

was 7% on 3/5/2021.  No episodes of hypoglycemia reported.  He continues to be 

maintained on Levemir 95 units in the morning and 90 units in the evening with 

hold parameters in place.  The patient's blood glucose levels have been as 

follows:





0700Am; range 132-268


12noon: range 154-330


1600: range 


2100: range 144-252





The patient continues to always be hungry and requests snacks.  Even today, the 

patient requested additional snack during this [facility] visit and sliced apples 

were for offered to the patient.  His weight remains stable at 202 pounds.





Does have a history of localized dermatitis to his upper extremities previously 

on a longstanding cortisone cream that was eventually discontinued and the 

patient remains on Aquaphor application to his bilateral upper extremities to 

reduce pruritus and a trial back dermatitis.





No behavioral concerns reported by staff.





The patient denies any reports of pain or discomfort.





The patient is seen resting in bed, alert.  No evidence of distress.  

Questioning by this [facility] is present and "what are we doing?"








Past Medical History: 





Patient has a past medical history of Parkinson's disease, dementia, 

hypertension, hyperlipidemia, CAD with stents, diabetes mellitus type 2, 

longstanding pruritus.











Social History





- Living Situation


Living arrangement: Assisted living


Support System: 








Patient is  and does not have any children.  His brother, Miguel Castañeda 

has guardianship and his contact number is [phone].  The patient has 

resided at home place [facility] since January 2016.











Medications/Allergies





- Medications


Home Medications: 


                                Ambulatory Orders











 Medication  Instructions  Recorded  Confirmed


 


Aspirin 81 mg 01/07/16 01/07/16


 


Acetaminophen 1,000 mg PO BID 02/05/20 06/09/20


 


Amlodipine Besylate 5 mg PO DAILY 02/05/20 06/09/20


 


Bisacodyl Supp [Dulcolax Supp] 10 mg [place] DAILY PRN MDD if no BM in 02/05/20 02/05/20





 12 hours  


 


Chlorthalidone 25 mg PO DAILY 02/05/20 06/09/20


 


Dextrose [Glucose Gel] 1 tube PO Q1HR PRN MDD for 02/05/20 02/05/20





 hypoglycemia  


 


Docusate Sodium 250Mg Capsule 250 mg PO DAILY PRN 02/05/20 02/05/20





[Colace 250Mg Capsule]   


 


Insulin Aspart [NovoLOG] 30 unit SQ TID MDD before meals 02/05/20 06/09/20


 


Insulin Detemir [Levemir Flextouch] 95 unit SQ DAILY 02/05/20 06/09/20


 


LORazepam [Ativan] 1 mg PO Q6H PRN 02/05/20 02/05/20


 


Loperamide HCl [Imodium A-D] 2 mg PO DAILY PRN 02/05/20 02/05/20


 


Mag Hydrox/Aluminum Hyd/Simeth 30 ml PO Q4H PRN 02/05/20 02/05/20





[Mylanta Maximum Strength Liq]   


 


Magnesium Hydroxide [Milk of 30 ml PO DAILY PRN MDD No BM in 3 02/05/20 02/05/20





Magnesia] days  


 


Menthol/Zinc Oxide [Calmoseptine 1 applic TP BID PRN 02/05/20 06/09/20





Ointment]   


 


Multivitamin [One Daily 1 tab PO DAILY 02/05/20 06/09/20





Multivitamin]   


 


Pravastatin Sodium 1 tab PO DAILY PM 02/05/20 06/09/20


 


Aquaphor Ointment 1 applic TP BID 02/06/20 06/09/20


 


Loratadine [Claritin] 10 mg PO DAILY PRN 03/05/20 03/05/20


 


polyethylene glycoL 3350 [Miralax] 17 g PO DAILY 06/09/20 06/09/20


 


Insulin Detemir [Levemir Flextouch] 90 units SQ QPM MDD Hold BG level 08/04/20 08/04/20





 less than 120  














- Allergies


Allergies/Adverse Reactions: 


                                    Allergies











Allergy/AdvReac Type Severity Reaction Status Date / Time


 


cheese Allergy  Unknown Verified 08/04/20 12:54


 


tobramycin Allergy  Unknown Verified 08/04/20 12:54














Review of Systems





- Constitutional


Constitutional: reports: Weight stable (202lb).  denies: Fatigue, Fever, Poor 

appetite





- Eyes


Eyes: denies: Irritation





- Ears, Nose & Throat


Ears, Nose & Throat: denies: Hearing aids





- Cardiovascular


Cardiovascular: denies: Edema





- Respiratory


Respiratory: denies: Cough





- Gastrointestinal


Gastrointestinal: reports: Good appetite (always requests more food).  denies: 

Constipation, Nausea





- Genitourinary


Genitourinary: reports: Incontinence.  denies: Dysuria





- Musculoskeletal


Musculoskeletal: reports: Assistive devices.  denies: Joint pain





- Integumentary


Integumentary: reports: Pruritis (chronic, presently controlled with aquaphor 

application to BUE with no excoriation appearing), Dryness





- Neurological


Neurological: reports: General weakness, Memory problems





- Psychiatric


Psychiatric: denies: Behavior disturbances





- Endocrine


Endocrine: reports: Diabetes type 2 (see HPI for blood glucose ranges)





- Hematologic/Lymphatic


Hematologic/Lymph: reports: Other (No history of recurrent infections)





- All Other Systems


All Other Systems: reports: Reviewed and negative (ROS supplemented by [facility] as patient is a poor historian due to dementia)





Physical Exam





- Vital Signs


Temperature: 36.5 C


Pulse Rate: 77


O2 Saturation: 97 (on RA)


Blood Pressure: 145/75 (right wrist cuff)





- Physical Exam


General Appearance: positive: No acute distress, Alert, Other ( resting in bed)


Eyes Bilateral: positive: Normal inspection


ENT: positive: No signs of dehydration


Neck: positive: Trachea midline


Cardiovascular: positive: Regular rate & rhythm


Respiratory: positive: No respiratory distress, Breath sounds nml.  negative: 

Rales


Abdomen: positive: Non-tender, Soft, Nml bowel sounds, Other (+round)


Skin: negative: Dryness ( no dryness noted to BUE or BLE due to aquaphor 

application; no evidence of excoriation to BUE from scratching)


Extremities: positive: No pedal edema.  negative: Joint swelling


Neurologic/Psychiatric: positive: Mood/affect nml, Disoriented to place, 

Disoriented to time, Weakness





Palliative Care





- POLST


Patient has POLST: Yes


POLST Status: DNR, Selective Treatment


Pain: No pain (managed on acetaminophen 1,000mg BID)


Sleep: Sleeps well


Constipation: No


Performance Status: 





PPS 50%





- Palliative Care


Discussion: 





Patient remains ambulatory and has been in this state for approximately 2 years.

 Continues to have a slow, functional and cognitive decline.  No behavioral 

disturbances reported.  Patient continues to have a voracious appetite and due 

to his underlying dementia is unaware when he has consumed meals despite 

redirection.  His weight remains stable at 202 pounds with no evidence of 

failure to thrive.





Results





- Lab Results


Lab results reviewed: Yes





Impression and Recommendations





- Palliative Care


Impression: 





This is a 81-year-old gentleman with Parkinson's disease dementia who has 

plateaued in  functional and cognitive decline presently, stable diabetes 

mellitus type 2 with stabilized weight and controlled pruritus to his upper 

extremities.  No acute concerns reported by the patient facility staff.  

Palliative care to continue provide support for symptom management, care 

coordination and anticipatory guidance.


Recommendations/Counseling Done: 





1. Diabetes mellitus type 2.  No hypoglycemic awareness.  Blood glucose log 

reviewed.  Last hemoglobin A1c 7.0% on 3/5/2021.  Continue Levemir 95 units in 

the morning and Levemir 90 units in the evening and hold if blood glucose levels

are less than 120.  Continue NovoLog 30 units 3 times daily with meals.  Patient

continues to have a hearty appetite.  Given the patient's advanced age goal 

hemoglobin A1c is 7 to 8% and patient falls within this range.  Continue to 

monitor blood glucose trends and adjust insulin therapy as needed.





2. Pruritus and generalized dryness.  Likely underlying atopic dermatitis.  Co

ntinue Aquaphor application to her extremities twice daily.  No evidence of 

excoriation from scratching to upper extremities.  Does have a longstanding 

history of topical corticosteroid application in the past that was discontinued 

in February 2020.





3.Hypertension.  Continue amlodipine and chlorthalidone at all as ordered.  

Continue aspirin 81 mg daily for cardiac prophylaxis.  Continue to monitor blood

pressure trends and adjust antihypertensive medications as needed.  Last BUN and

creatinine 30 and 0.89 on 3/5/2021 and remained stable.  Sodium 138 and 

potassium 4.0 on 3/5/2021.  Continue to monitor blood pressure trends.





4.  Parkinson's disease dementia.  Chronic.  Progressive.  Fall precautions.  On

no disease modifying agents.  No noted weight loss.  A continued gradual, slow 

and progressive decline is expected.





CPT 05042





Plan of care reviewed with facility [facility] with questions answered and 

addressed.





Disclaimer: The chart note was formulated using voice recognition technology and

unfortunately sound alike errors may occur.

## 2021-08-24 ENCOUNTER — HOSPITAL ENCOUNTER (OUTPATIENT)
Dept: HOSPITAL 76 - PC | Age: 81
Discharge: HOME | End: 2021-08-24
Attending: NURSE PRACTITIONER
Payer: MEDICARE

## 2021-08-24 DIAGNOSIS — Z79.4: ICD-10-CM

## 2021-08-24 DIAGNOSIS — L30.9: ICD-10-CM

## 2021-08-24 DIAGNOSIS — G20: ICD-10-CM

## 2021-08-24 DIAGNOSIS — I10: ICD-10-CM

## 2021-08-24 DIAGNOSIS — Z51.5: Primary | ICD-10-CM

## 2021-08-24 DIAGNOSIS — Z66: ICD-10-CM

## 2021-08-24 DIAGNOSIS — E11.9: ICD-10-CM

## 2021-08-24 DIAGNOSIS — F02.80: ICD-10-CM

## 2021-08-24 NOTE — CONSULTATION NOTE
Palliative Care Follow Up





- Referral


Referring Provider: Dr. Ignacio Garcia


Time of Visit: Intiated 1600


Referral setting: Assisted living


Referral Reason: Parkinson's Disease/Dementia/DM type II





- Information Sources


Records reviewed: Previous records reviewed


History/Review of Systems obtained from: Patient, Caregiver, Nursing ([facility])


Exam limitations: Clinical condition (Advanced Dementia)





- History of Present Illness Update


Brief HPI Update: 





This is a 81-year-old gentleman who was seen in follow-up today with a history 

of Parkinson's disease, dementia, and type 2 diabetes mellitus who resides at 

home place [facility] seen in routine follow-up today.





The patient has a history of diabetes mellitus type 2.  His last hemoglobin A1c 

was 6.8% on 6/2021.  No episodes of hypoglycemia reported.  He continues to be 

maintained on Levemir 95 units in the morning and 90 units in the evening with 

hold parameters in place. He has had has insulin held 3 times in the AM and 3 

times in the PM. 





The patient continues to always be hungry and requests snacks.  Even today, the 

patient requested additional snack during this [facility] visit and pretzels were for

offered to the patient.  His weight remains stable at 202 pounds.





Does have a history of localized dermatitis to his upper extremities previously 

on a longstanding cortisone cream that was eventually discontinued and the 

patient remains on Aquaphor application to his bilateral upper extremities to 

reduce pruritus.





No behavioral concerns reported by staff.





The patient denies any reports of pain or discomfort.





The patient is seen resting in bed, alert.  No evidence of distress.  





Past Medical History: 








Patient has a past medical history of Parkinson's disease, dementia, 

hypertension, hyperlipidemia, CAD with stents, diabetes mellitus type 2, 

longstanding pruritus.











Social History





- Living Situation


Living arrangement: Assisted living


Support System: 








Patient is  and does not have any children.  His brother, Miguel Castañeda 

has guardianship and his contact number is [phone].  The patient has 

resided at home place [facility] since January 2016.





Medications/Allergies





- Medications


Home Medications: 


                                Ambulatory Orders











 Medication  Instructions  Recorded  Confirmed


 


Aspirin 81 mg 01/07/16 01/07/16


 


Acetaminophen 1,000 mg PO BID 02/05/20 06/09/20


 


Amlodipine Besylate 5 mg PO DAILY 02/05/20 06/09/20


 


Bisacodyl Supp [Dulcolax Supp] 10 mg CA DAILY PRN MDD if no BM in 02/05/20 02/05/20





 12 hours  


 


Chlorthalidone 25 mg PO DAILY 02/05/20 06/09/20


 


Dextrose [Glucose Gel] 1 tube PO Q1HR PRN MDD for 02/05/20 02/05/20





 hypoglycemia  


 


Docusate Sodium 250Mg Capsule 250 mg PO DAILY PRN 02/05/20 02/05/20





[Colace 250Mg Capsule]   


 


Insulin Aspart [NovoLOG] 30 unit SQ TID MDD before meals 02/05/20 06/09/20


 


Insulin Detemir [Levemir Flextouch] 92 unit SQ DAILY 02/05/20 06/09/20


 


LORazepam [Ativan] 1 mg PO Q6H PRN 02/05/20 02/05/20


 


Loperamide HCl [Imodium A-D] 2 mg PO DAILY PRN 02/05/20 02/05/20


 


Mag Hydrox/Aluminum Hyd/Simeth 30 ml PO Q4H PRN 02/05/20 02/05/20





[Mylanta Maximum Strength Liq]   


 


Magnesium Hydroxide [Milk of 30 ml PO DAILY PRN MDD No BM in 3 02/05/20 02/05/20





Magnesia] days  


 


Menthol/Zinc Oxide [Calmoseptine 1 applic TP BID PRN 02/05/20 06/09/20





Ointment]   


 


Multivitamin [One Daily 1 tab PO DAILY 02/05/20 06/09/20





Multivitamin]   


 


Pravastatin Sodium 1 tab PO DAILY PM 02/05/20 06/09/20


 


Aquaphor Ointment 1 applic TP BID 02/06/20 06/09/20


 


Loratadine [Claritin] 10 mg PO DAILY PRN 03/05/20 03/05/20


 


polyethylene glycoL 3350 [Miralax] 17 g PO DAILY 06/09/20 06/09/20


 


Insulin Detemir [Levemir Flextouch] 90 units SQ QPM MDD Hold BG level 08/04/20 08/04/20





 less than 120  














- Allergies


Allergies/Adverse Reactions: 


                                    Allergies











Allergy/AdvReac Type Severity Reaction Status Date / Time


 


cheese Allergy  Unknown Verified 08/04/20 12:54


 


tobramycin Allergy  Unknown Verified 08/04/20 12:54














Review of Systems





- Constitutional


Constitutional: reports: Weight stable (202.8lb 8/2/2021).  denies: Fatigue, 

Fever, Poor appetite





- Eyes


Eyes: denies: Corrective lenses





- Ears, Nose & Throat


Ears, Nose & Throat: denies: Dentures





- Cardiovascular


Cardiovascular: denies: Chest pain, Edema





- Respiratory


Respiratory: denies: Cough





- Gastrointestinal


Gastrointestinal: reports: Good appetite (always requests more food and consumes

all his meals).  denies: Constipation, Vomiting





- Genitourinary


Genitourinary: reports: Incontinence.  denies: Dysuria





- Musculoskeletal


Musculoskeletal: reports: Assistive devices.  denies: Joint pain





- Integumentary


Integumentary: reports: Pruritis (chronic, presently controlled with aquaphor 

application to BUE), Dryness





- Neurological


Neurological: reports: General weakness, Memory problems





- Endocrine


Endocrine: reports: Diabetes type 2 (see HPI for blood glucose ranges)





- Hematologic/Lymphatic


Hematologic/Lymph: reports: Other (No history of recurrent infections)





- All Other Systems


All Other Systems: reports: Reviewed and negative (ROS supplemented by [facility] as patient is a poor historian due to dementia)





Physical Exam





- Vital Signs


Temperature: 36.1 C


Pulse Rate: 87


O2 Saturation: 96 (on RA)


Blood Pressure: 114/64 (right wrist)





- Physical Exam


General Appearance: positive: No acute distress, Alert, Other (resting in bed)


Eyes Bilateral: positive: Normal inspection


ENT: positive: No signs of dehydration


Neck: positive: Trachea midline


Cardiovascular: positive: Regular rate & rhythm


Respiratory: positive: No respiratory distress, Breath sounds nml


Abdomen: positive: Non-tender, Soft, Nml bowel sounds, Other (+round)


Skin: negative: Dryness ( no dryness noted to BUE or BLE due to aquaphor 

application; no evidence of excoriation to BUE from scratching)


Extremities: positive: No pedal edema.  negative: Joint swelling


Neurologic/Psychiatric: positive: Mood/affect nml, Disoriented to place, 

Disoriented to time, Weakness





Palliative Care





- POLST


Patient has POLST: Yes


POLST Status: DNR


Pain: No pain


Sleep: Sleeps well


Constipation: No, Managed


Performance Status: 





PPS 50%





Results





- Lab Results


Lab results reviewed: Yes


Lab and Imaging Results: 





6/16/2021





HgA1C 6.8%


Sodium 138, Potassium 3.9, BUN 22, Cr 0.73, Glucose 193, Alk phos 108, AST 20, 

ALT 23





Impression and Recommendations





- Palliative Care


Impression: 





This is a 81-year-old gentleman with Parkinson's disease dementia who has 

plateaued in  functional and cognitive decline presently, stable diabetes 

mellitus type 2 with stabilized weight and controlled pruritus to his upper 

extremities. His last HgA1C was 6.8% in June 2021 with some recent lows and will

decrease AM dose of Levemir to 92 units and continue 90 untis in the evening.  

Palliative care to continue provide support for symptom management, care 

coordination and anticipatory guidance.





Recommendations/Counseling Done: 








1. Diabetes mellitus type 2.  No hypoglycemic awareness.  Blood glucose log 

reviewed.  Last hemoglobin A1c 6.8% on 6/2021.  Reduce Levemir to 92 units in 

the morning and continue Levemir 90 units in the evening and hold if blood 

glucose levels are less than 120.  Continue NovoLog 30 units 3 times daily with 

meals.  Patient continues to have a hearty appetite.  Given the patient's 

advanced age goal hemoglobin A1c is 7 to 8% and patient falls within this range.

 Continue to monitor blood glucose trends and adjust insulin therapy as needed. 

Next HgA1C due in September 2021. 





2. Pruritus and generalized dryness.  Likely underlying atopic dermatitis.  

Continue Aquaphor application to her extremities twice daily.  No evidence of 

excoriation from scratching to upper extremities.  Does have a longstanding 

history of topical corticosteroid application in the past that was discontinued 

in February 2020.





3. Hypertension.  Continue amlodipine and chlorthalidone at all as ordered.  

Continue aspirin 81 mg daily for cardiac prophylaxis.  Continue to monitor blood

pressure trends and adjust antihypertensive medications as needed.  Continue to 

monitor blood pressure trends.





4.  Parkinson's disease dementia.  Chronic.  Progressive.  Fall precautions.  On

no disease modifying agents.  No noted weight loss.  A continued gradual, slow 

and progressive decline is expected.








CPT 97254





Plan of care reviewed reviewed with facility [facility] Do[facility] with questions answered 

and addressed. 





Disclaimer: The chart note was formulated using voice recognition technology and

unfortunately sound alike errors may occur.

## 2022-01-11 ENCOUNTER — HOSPITAL ENCOUNTER (OUTPATIENT)
Dept: HOSPITAL 76 - PC | Age: 82
Discharge: HOME | End: 2022-01-11
Attending: NURSE PRACTITIONER
Payer: MEDICARE

## 2022-01-11 DIAGNOSIS — R63.4: ICD-10-CM

## 2022-01-11 DIAGNOSIS — Z51.5: Primary | ICD-10-CM

## 2022-01-11 DIAGNOSIS — I10: ICD-10-CM

## 2022-01-11 DIAGNOSIS — F02.80: ICD-10-CM

## 2022-01-11 DIAGNOSIS — Z79.4: ICD-10-CM

## 2022-01-11 DIAGNOSIS — G20: ICD-10-CM

## 2022-01-11 DIAGNOSIS — Z66: ICD-10-CM

## 2022-01-11 DIAGNOSIS — E11.9: ICD-10-CM

## 2022-01-11 NOTE — CONSULTATION NOTE
Palliative Care Follow Up





- Referral


Referring Provider: Dr. Ignacio Garcia


Time of Visit: Initiated 1145


Referral setting: Assisted living


Referral Reason: Dementia/DM Type II





- Information Sources


Records reviewed: Previous records reviewed


History/Review of Systems obtained from: Patient, Nursing (LEESA Olmos)


Exam limitations: Clinical condition (Advanced Dementia)





- History of Present Illness Update


Brief HPI Update: 





This is a 81-year-old man who is seen in follow-up today at home [facility] 

care with a history of Parkinson's disease, dementia, and type 2 diabetes 

mellitus.





Provider were N95 mask. Patient has a history of diabetes mellitus type 2. Last 

hemoglobin A1c 6.5% when 12/22/2021. No episodes of hypoglycemia reported. Staff

continue to intermittently hold his NovoLog injection due to blood glucose level

less than 120. Patient is presently on Levemir 88 units in the morning and 86 

units in the evening. He is on NovoLog 25 units 3 times daily before meals and 

to hold if blood glucose level is less than 120 or symptomatic. Upon review of 

MAR patient's blood glucose level has been held in the morning for his NovoLog 6

times in the month of January. Would benefit from dose reduction of NovoLog.





The patient continues to have a hearty appetite. However, he is having some 

gradual weight loss to 189 pounds reported in December 2021. In August 2021 the 

patient was 202 pounds.





The staff denies any behavioral concerns.





The patient denies any reports of pain or discomfort.





The patient is seen out of bed in his wheelchair in the common area, no evidence

of distress.


Past Medical History: 





Patient has a past medical history of Parkinson's disease, dementia, hype

rtension, hyperlipidemia, CAD with stents, diabetes mellitus type 2, 

longstanding pruritus.











Social History





- Living Situation


Living arrangement: Assisted living


Support System: 








Patient is  and does not have any children.  His brother, Miguel Castañeda 

has guardianship and his contact number is [phone].  The patient has 

resided at home [facility] since January 2016.





Medications/Allergies





- Medications


Home Medications: 


                                Ambulatory Orders











 Medication  Instructions  Recorded  Confirmed


 


Aspirin 81 mg 01/07/16 01/07/16


 


Acetaminophen 1,000 mg PO BID 02/05/20 06/09/20


 


Amlodipine Besylate 5 mg PO DAILY 02/05/20 06/09/20


 


Bisacodyl Supp [Dulcolax Supp] 10 mg ID DAILY PRN MDD if no BM in 02/05/20 02/05/20





 12 hours  


 


Chlorthalidone 25 mg PO DAILY 02/05/20 06/09/20


 


Dextrose [Glucose Gel] 1 tube PO Q1HR PRN MDD for 02/05/20 02/05/20





 hypoglycemia  


 


Docusate Sodium 250Mg Capsule 250 mg PO DAILY PRN 02/05/20 02/05/20





[Colace 250Mg Capsule]   


 


Insulin Aspart [NovoLOG] 22 unit SQ TID MDD before meals 02/05/20 06/09/20


 


Insulin Detemir [Levemir Flextouch] 88 unit SQ DAILY 02/05/20 06/09/20


 


LORazepam [Ativan] 1 mg PO Q6H PRN 02/05/20 02/05/20


 


Loperamide HCl [Imodium A-D] 2 mg PO DAILY PRN 02/05/20 02/05/20


 


Mag Hydrox/Aluminum Hyd/Simeth 30 ml PO Q4H PRN 02/05/20 02/05/20





[Mylanta Maximum Strength Liq]   


 


Magnesium Hydroxide [Milk of 30 ml PO DAILY PRN MDD No BM in 3 02/05/20 02/05/20





Magnesia] days  


 


Menthol/Zinc Oxide [Calmoseptine 1 applic TP BID PRN 02/05/20 06/09/20





Ointment]   


 


Multivitamin [One Daily 1 tab PO DAILY 02/05/20 06/09/20





Multivitamin]   


 


Pravastatin Sodium 1 tab PO DAILY PM 02/05/20 06/09/20


 


Aquaphor Ointment 1 applic TP BID 02/06/20 06/09/20


 


Loratadine [Claritin] 10 mg PO DAILY PRN 03/05/20 03/05/20


 


polyethylene glycoL 3350 [Miralax] 17 g PO DAILY 06/09/20 06/09/20


 


Insulin Detemir [Levemir Flextouch] 86 units SQ QPM MDD Hold BG level 08/04/20 08/04/20





 less than 120  














- Allergies


Allergies/Adverse Reactions: 


                                    Allergies











Allergy/AdvReac Type Severity Reaction Status Date / Time


 


cheese Allergy  Unknown Verified 08/04/20 12:54


 


tobramycin Allergy  Unknown Verified 08/04/20 12:54














Review of Systems





- Constitutional


Constitutional: reports: Weight loss (weight 189 12/2021; 202.8lb 8/2/2021).  

denies: Fever, Poor appetite





- Eyes


Eyes: denies: Irritation





- Ears, Nose & Throat


Ears, Nose & Throat: denies: Dentures





- Cardiovascular


Cardiovascular: denies: Chest pain, Edema





- Respiratory


Respiratory: denies: Cough





- Gastrointestinal


Gastrointestinal: reports: Good appetite (always requests more food and consumes

all his meals).  denies: Constipation, Vomiting





- Genitourinary


Genitourinary: reports: Incontinence.  denies: Dysuria





- Musculoskeletal


Musculoskeletal: reports: Assistive devices.  denies: Joint pain





- Integumentary


Integumentary: reports: Pruritis (chronic, presently controlled with aquaphor 

application to BUE), Dryness





- Neurological


Neurological: reports: General weakness, Memory problems





- Endocrine


Endocrine: reports: Diabetes type 2 (last HgA1C 6.5% 12/2021)





- Hematologic/Lymphatic


Hematologic/Lymph: reports: Other (No history of recurrent infections)





- All Other Systems


All Other Systems: reports: Reviewed and negative (ROS supplemented by LEESA Doherty as patient is a poor historian due to dementia)





Physical Exam





- Vital Signs


Temperature: 36.9 C


Pulse Rate: 77


O2 Saturation: 96 (on RA)


Blood Pressure: 137/83 (left wrist)





- Physical Exam


General Appearance: positive: No acute distress, Alert, Other (OOB in 

wheelchair)


Eyes Bilateral: positive: Normal inspection


ENT: positive: No signs of dehydration


Neck: positive: Trachea midline


Cardiovascular: positive: Regular rate & rhythm


Respiratory: positive: No respiratory distress, Breath sounds nml.  negative: 

Wheezes


Abdomen: positive: Non-tender, Soft, Nml bowel sounds, Other (+round)


Skin: positive: Bruising (left lateral aspect of wrist--resolving).  negative: 

Dryness ( no dryness noted to BUE or BLE due to aquaphor application; no 

evidence of excoriation to BUE from scratching)


Extremities: positive: No pedal edema.  negative: Joint swelling


Neurologic/Psychiatric: positive: Mood/affect nml, Disoriented to place, 

Disoriented to time, Weakness





Palliative Care





- POLST


Patient has POLST: Yes


POLST Status: DNR, [facility]


Pain: No pain


Performance Status: 





Nonambulatory. Requires assistance for all ADLs.





- Palliative Care


Discussion: 





Patient continues to have of lower blood glucose levels first thing in the 

morning and requires his NovoLog to be held frequently for throughout the day. 

Given he has tight control of his blood glucose levels with last hemoglobin A1c 

6.5% and goal given advanced age is 7 to 8% we will decrease NovoLog dosage.





The patient is demonstrating signs of gradual decline in his weight which is not

unexpected given his advancing dementia. Continues to consume all of his meals 

and frequently requests for snacks.





Impression and Recommendations





- Palliative Care


Impression: 





This is a 81-year-old man with Parkinson's disease dementia, weight loss, 

hypertension, and diabetes mellitus type 2. His last hemoglobin A1c was 6.5% in 

December 2021 and with reported lows in the morning we will decrease his NovoLog

dosage. Palliative care to continue to provide support for symptom management, 

care coordination and anticipatory guidance as it is a taxing effort for the 

patient to leave the facility environment.


Recommendations/Counseling Done: 





1. Diabetes mellitus type 2. No hypoglycemic awareness. Blood glucose log 

reviewed. Last hemoglobin A1c 6.5% on 12/2021. Reduce NovoLog to 22 units 

injected 3 times a day before meals. Hold if blood glucose level is less than 

100 or symptomatic. Continue Levemir 88 units in the morning and 86 units in the

evening. Continue to monitor blood glucose trends and adjust insulin therapy as 

needed. Given the patient's advanced age goal hemoglobin A1c is 7 to 8%.





2. Hypertension. No cardiac awareness. Continue amlodipine and chlorthalidone as

prescribed. As patient continues to potentially lose weight continue to consider

dose reduction of chlorthalidone in the future. Continue aspirin 81 mg daily for

cardiac prophylaxis. Continue to monitor blood Pressure trends and adjust 

antihypertensive medications accordingly.





3. Weight loss. Patient has had a reduction in weight from 202 pounds in August 20 21 to 189 pounds in December 2021. This is not unexpected given the patient's

advancing dementia. However, the patient continues to have a hearty appetite and

consuming the majority of his meals. Continue to monitor weight loss trends and 

evaluate the need for supplementation in the future.





4. Parkinson's disease dementia. Chronic. Progressive. Fall precautions. No 

disease modifying agents. No behavioral concerns reported by facility staff. A 

continued gradual, slow and progressive decline is expected.





CPT 02775





Facility [facility] requesting that this ARNP assist with patient's PCP signing 

for physician orders. Physicians office is requesting for office visit but given

the exceedingly taxing effort it takes for the patient to leave the facility as 

well as lack of transportation as there is no facility van will make request.





Disclaimer: The chart note was formulated using voice recognition technology and

unfortunately sound alike errors may occur.

## 2022-05-10 ENCOUNTER — HOSPITAL ENCOUNTER (OUTPATIENT)
Dept: HOSPITAL 76 - PC | Age: 82
Discharge: HOME | End: 2022-05-10
Attending: NURSE PRACTITIONER
Payer: MEDICARE

## 2022-05-10 DIAGNOSIS — Z79.899: ICD-10-CM

## 2022-05-10 DIAGNOSIS — Z51.5: Primary | ICD-10-CM

## 2022-05-10 DIAGNOSIS — F02.80: ICD-10-CM

## 2022-05-10 DIAGNOSIS — G20: ICD-10-CM

## 2022-05-10 DIAGNOSIS — Z66: ICD-10-CM

## 2022-05-10 DIAGNOSIS — E11.9: ICD-10-CM

## 2022-05-10 DIAGNOSIS — Z79.4: ICD-10-CM

## 2022-05-10 DIAGNOSIS — S60.512D: ICD-10-CM

## 2022-05-10 NOTE — CONSULTATION NOTE
Palliative Care Follow Up





- Referral


Referring Provider: Dr. Ignacio Garcia


Time of Visit: Initiated 1410


Referral setting: Assisted living


Referral Reason: DM type II/Wound to left hand/Dementia





- Information Sources


Records reviewed: Previous records reviewed


History/Review of Systems obtained from: Patient, Nursing (LEESA Olmos and Dimple)


Exam limitations: Clinical condition (Advanced Dementia)





- History of Present Illness Update


Brief HPI Update: 





This is an 82-year-old man who is seen in follow-up today at [facility] with history of Parkinson's disease, dementia, type 2 diabetes mellitus now

with Wound to left hand.





Provider wore N95 mask.





Patient has a history of diabetes mellitus type 2.  Last hemoglobin A1c 6.8% on 

3/24/2022.  Presently on Levemir 80 mg twice daily and NovoLog injections 16 

units 3 times a day before meals.  Continues to have reported lows in the 

morning.  Blood glucose level at 8 AM range 72 -132.  Blood glucose level at 

noon 148-229.  Blood glucose level at 5 -278.He continues to have his 

insulin held periodically due to blood glucose level being less than 100.  Would

benefit from further dose reduction of NovoLog as well as Levemir. 





Patient was noted to have an abrasion/skin tear between his thumb and index 

finger of his left hand last week.  Staff are unclear how this occurred.  Has 

been receiving local wound care per facility protocol and has Steri-Strips in 

place.  No signs or symptoms of infection.





Patient denies any reports of pain and discomfort.





Staff deny any behavioral concerns and no recent falls.


Past Medical History: 





Patient has a past medical history of Parkinson's disease, dementia, 

hypertension, hyperlipidemia, CAD with stents, diabetes mellitus type 2, 

longstanding pruritus.











Social History





- Living Situation


Living arrangement: Assisted living


Support System: 











Patient is  and does not have any children.  His brother, Miguel Castañeda 

has guardianship and his contact number is [phone].  The patient has 

resided at [facility] since January 2016.





Medications/Allergies





- Medications


Home Medications: 


                                Ambulatory Orders











 Medication  Instructions  Recorded  Confirmed


 


Aspirin 81 mg 01/07/16 01/07/16


 


Acetaminophen 1,000 mg PO BID 02/05/20 06/09/20


 


Amlodipine Besylate 5 mg PO DAILY 02/05/20 06/09/20


 


Bisacodyl Supp [Dulcolax Supp] 10 mg [place] DAILY PRN MDD if no BM in 02/05/20 02/05/20





 12 hours  


 


Chlorthalidone 25 mg PO DAILY 02/05/20 06/09/20


 


Dextrose [Glucose Gel] 1 tube PO Q1HR PRN MDD for 02/05/20 02/05/20





 hypoglycemia  


 


Docusate Sodium 250Mg Capsule 250 mg PO DAILY PRN 02/05/20 02/05/20





[Colace 250Mg Capsule]   


 


Insulin Aspart [NovoLOG] 14 unit SQ TID MDD before meals 02/05/20 06/09/20


 


Insulin Detemir [Levemir Flextouch] 78 unit SQ DAILY 02/05/20 06/09/20


 


Loperamide HCl [Imodium A-D] 2 mg PO DAILY PRN 02/05/20 02/05/20


 


Mag Hydrox/Aluminum Hyd/Simeth 30 ml PO Q4H PRN 02/05/20 02/05/20





[Mylanta Maximum Strength Liq]   


 


Magnesium Hydroxide [Milk of 30 ml PO DAILY PRN MDD No BM in 3 02/05/20 02/05/20





Magnesia] days  


 


Menthol/Zinc Oxide [Calmoseptine 1 applic TP BID PRN 02/05/20 06/09/20





Ointment]   


 


Multivitamin [One Daily 1 tab PO DAILY 02/05/20 06/09/20





Multivitamin]   


 


Pravastatin Sodium 1 tab PO DAILY PM 02/05/20 06/09/20


 


Aquaphor Ointment 1 applic TP BID 02/06/20 06/09/20


 


Loratadine [Claritin] 10 mg PO DAILY PRN 03/05/20 03/05/20


 


polyethylene glycoL 3350 [Miralax] 17 g PO DAILY 06/09/20 06/09/20


 


Insulin Detemir [Levemir Flextouch] 80 units SQ QPM MDD Hold BG level 08/04/20 08/04/20





 less than 100  














- Allergies


Allergies/Adverse Reactions: 


                                    Allergies











Allergy/AdvReac Type Severity Reaction Status Date / Time


 


cheese Allergy  Unknown Verified 08/04/20 12:54


 


tobramycin Allergy  Unknown Verified 08/04/20 12:54














Review of Systems





- Constitutional


Constitutional: reports: Weight loss (weight 189 12/2021; 202.8lb 8/2/2021).  

denies: Fever, Poor appetite





- Eyes


Eyes: denies: Irritation





- Ears, Nose & Throat


Ears, Nose & Throat: denies: Dentures





- Cardiovascular


Cardiovascular: denies: Edema





- Respiratory


Respiratory: denies: Cough





- Gastrointestinal


Gastrointestinal: reports: Good appetite (always requests more food and consumes

all his meals).  denies: Abdominal pain, Constipation, Vomiting





- Genitourinary


Genitourinary: reports: Incontinence.  denies: Dysuria





- Musculoskeletal


Musculoskeletal: reports: Assistive devices.  denies: Joint pain





- Integumentary


Integumentary: reports: Pruritis (chronic, presently controlled with aquaphor 

application to BUE), Dryness, Other (Scab to left hand between thumb and index 

finger with steri-strips in place)





- Neurological


Neurological: reports: General weakness, Memory problems





- Endocrine


Endocrine: reports: Diabetes type 2 (last HgA1C 6.8% on 3/24/22; HgA1C 6.5% 

12/2021)





- Hematologic/Lymphatic


Hematologic/Lymph: reports: Other (No history of recurrent infections)





- All Other Systems


All Other Systems: reports: Reviewed and negative (ROS supplemented by LEESA Doherty and Dimple as patient is a poor historian due to dementia)





Physical Exam





- Vital Signs


Temperature: 36.6 C


Pulse Rate: 74


O2 Saturation: 97 (on RA)


Blood Pressure: 115/63





- Physical Exam


General Appearance: positive: No acute distress, Alert, Other (resting in bed)


Eyes Bilateral: positive: Normal inspection


ENT: positive: No signs of dehydration


Neck: positive: Trachea midline


Cardiovascular: positive: Regular rate & rhythm


Respiratory: positive: No respiratory distress, Breath sounds nml


Abdomen: positive: Non-tender, Soft, Nml bowel sounds, Other (+round)


Skin: positive: Wound (Scab 1.5cm x 0.5cm on left hand between 1st and 2nd digit

without discharge or surrounding erythema. Noted bruisig to patient's lopez 

surface to tip of 1st digit, 2nd digit and 5th digit--resolving), Other (+Flaky 

skin to face).  negative: Dryness ( no dryness noted to BUE or BLE due to 

aquaphor application;)


Extremities: positive: No pedal edema


Neurologic/Psychiatric: positive: Mood/affect nml, Disoriented to place, 

Disoriented to time, Weakness, Other (Circular thougths and perseverates on 

examination questions)





Palliative Care





- POLST


Patient has POLST: Yes


POLST Status: DNR, Selective Treatment


Pain: No pain


Performance Status: 





Nonambulatory and requires assistance for all ADLS. 





FAST 7C





- Palliative Care


Discussion: 





Patient continues to have noted lows related to his blood glucose level and 

requires his Levemir or Luer-Ousmane to be held during the day.  Last hemoglobin A1c

6.8% and goal given advanced age is 7 to 8% and will decrease Levemir and 

NovoLog dosage.





Impression and Recommendations





- Palliative Care


Impression: 





This is an 82-year-old gentleman with Parkinson's disease dementia, diabetes 

mellitus type 2 and resolving wound to left hand.  Last hemoglobin A1c 6.8% and 

given reported lows will decrease NovoLog dosage and Levemir dosage as noted 

below.  Palliative care will continue to provide support for symptom management,

care coordination and anticipatory guidance as it is a taxing effort for the 

patient to leave the facility environment.


Recommendations/Counseling Done: 





1.  Diabetes mellitus type 2.  No hypoglycemic awareness.  Blood glucose trends 

reviewed on MAR.  Last hemoglobin A1c 6.8% on 3/24/2022.  Reduce NovoLog to 14 

units injected subcutaneously 3 times daily before meals and hold if blood 

glucose is less than 100.  Reduce Levemir to 78 units in the morning and 

continue Levemir 80 units in the evening and hold if blood glucose is less than 

100.  Continue to monitor blood glucose levels and adjust insulin therapy as 

needed.  Given the patient's advanced age goal hemoglobin A1c is 7 to 8%.





2. Scab to left hand, resolving. No signs or symptoms of infection.  Given 

location anatomically appears as though he had an caused by the patient's use of

sit to stand.  Reviewed precautions with facility staff.  Continue local skin 

care per facility protocol and monitor for signs and symptoms of infection and 

make an D/ARNP aware.





3.  Parkinson's disease dementia.  Chronic.  Progressive.  Fall precautions.  On

no disease modifying agents.  No behavioral concerns reported by facility staff.

 At gradual, slow and progressive decline is expected given the patient's 

advanced age.





CPT 78856





Plan of care reviewed with LEESA Bowie with questions answered and 

addressed.





Disclaimer: The chart note was formulated using voice recognition technology and

unfortunately sound alike errors may occur.